# Patient Record
Sex: MALE | NOT HISPANIC OR LATINO | URBAN - METROPOLITAN AREA
[De-identification: names, ages, dates, MRNs, and addresses within clinical notes are randomized per-mention and may not be internally consistent; named-entity substitution may affect disease eponyms.]

---

## 2017-12-20 ENCOUNTER — OFFICE (OUTPATIENT)
Dept: URBAN - METROPOLITAN AREA CLINIC 78 | Facility: CLINIC | Age: 22
End: 2017-12-20
Payer: MEDICAID

## 2017-12-20 VITALS
SYSTOLIC BLOOD PRESSURE: 140 MMHG | WEIGHT: 246 LBS | HEART RATE: 91 BPM | DIASTOLIC BLOOD PRESSURE: 99 MMHG | TEMPERATURE: 97.4 F | HEIGHT: 66 IN

## 2017-12-20 DIAGNOSIS — R11.10 VOMITING, UNSPECIFIED: ICD-10-CM

## 2017-12-20 DIAGNOSIS — R19.7 DIARRHEA, UNSPECIFIED: ICD-10-CM

## 2017-12-20 DIAGNOSIS — R10.33 PERIUMBILICAL PAIN: ICD-10-CM

## 2017-12-20 PROCEDURE — 99204 OFFICE O/P NEW MOD 45 MIN: CPT

## 2017-12-20 NOTE — SERVICEHPINOTES
DREW HEBERT   is a   22   year old male who is being seen in consultation at the request of   BYRON MARTINEZ   for vomiting and diarrhea since December 7th. Patient has developmental disability and his mother is present and provides the history. He was born premature at 24 weeks, had G-tube early on, has PMH seizures, HTN, dyslipidemia, obesity, DM. Patient has been able to drink fluids but otherwise is not eating much and is having vomiting and diarrhea. He has been to the ER a couple of times - was advised to do clear liquids. When he has been able to consume food, it often comes back up 1-2 days later. He complains of pain in periumbilical region. He had Xray at ER on 12/12 - showed dilated air-filled loops of small bowel in left side of abd - possible early SBO. He started metformin a few months ago - was switched to a different form and dosage of this prior to symptom onset. No fevers.

## 2018-01-09 ENCOUNTER — ON CAMPUS - OUTPATIENT (OUTPATIENT)
Dept: URBAN - METROPOLITAN AREA HOSPITAL 14 | Facility: HOSPITAL | Age: 23
End: 2018-01-09
Payer: MEDICAID

## 2018-01-09 DIAGNOSIS — K29.60 OTHER GASTRITIS WITHOUT BLEEDING: ICD-10-CM

## 2018-01-09 DIAGNOSIS — R11.0 NAUSEA: ICD-10-CM

## 2018-01-09 DIAGNOSIS — R19.7 DIARRHEA, UNSPECIFIED: ICD-10-CM

## 2018-01-09 DIAGNOSIS — R10.84 GENERALIZED ABDOMINAL PAIN: ICD-10-CM

## 2018-01-09 PROCEDURE — 43239 EGD BIOPSY SINGLE/MULTIPLE: CPT
